# Patient Record
Sex: FEMALE | Race: WHITE | NOT HISPANIC OR LATINO
[De-identification: names, ages, dates, MRNs, and addresses within clinical notes are randomized per-mention and may not be internally consistent; named-entity substitution may affect disease eponyms.]

---

## 2019-06-24 PROBLEM — Z00.00 ENCOUNTER FOR PREVENTIVE HEALTH EXAMINATION: Status: ACTIVE | Noted: 2019-06-24

## 2019-07-02 ENCOUNTER — APPOINTMENT (OUTPATIENT)
Dept: SURGERY | Facility: CLINIC | Age: 34
End: 2019-07-02

## 2019-07-02 ENCOUNTER — RECORD ABSTRACTING (OUTPATIENT)
Age: 34
End: 2019-07-02

## 2019-07-29 ENCOUNTER — APPOINTMENT (OUTPATIENT)
Dept: SURGERY | Facility: CLINIC | Age: 34
End: 2019-07-29
Payer: MEDICAID

## 2019-07-29 VITALS
WEIGHT: 126 LBS | HEIGHT: 62 IN | BODY MASS INDEX: 23.19 KG/M2 | HEART RATE: 78 BPM | SYSTOLIC BLOOD PRESSURE: 117 MMHG | DIASTOLIC BLOOD PRESSURE: 80 MMHG

## 2019-07-29 DIAGNOSIS — Z82.3 FAMILY HISTORY OF STROKE: ICD-10-CM

## 2019-07-29 DIAGNOSIS — Z86.79 PERSONAL HISTORY OF OTHER DISEASES OF THE CIRCULATORY SYSTEM: ICD-10-CM

## 2019-07-29 DIAGNOSIS — I61.5 NONTRAUMATIC INTRACEREBRAL HEMORRHAGE, INTRAVENTRICULAR: ICD-10-CM

## 2019-07-29 DIAGNOSIS — R55 SYNCOPE AND COLLAPSE: ICD-10-CM

## 2019-07-29 DIAGNOSIS — K42.9 UMBILICAL HERNIA W/OUT OBSTRUCTION OR GANGRENE: ICD-10-CM

## 2019-07-29 DIAGNOSIS — N83.209 UNSPECIFIED OVARIAN CYST, UNSPECIFIED SIDE: ICD-10-CM

## 2019-07-29 DIAGNOSIS — Z83.3 FAMILY HISTORY OF DIABETES MELLITUS: ICD-10-CM

## 2019-07-29 DIAGNOSIS — Z86.59 PERSONAL HISTORY OF OTHER MENTAL AND BEHAVIORAL DISORDERS: ICD-10-CM

## 2019-07-29 PROCEDURE — 99204 OFFICE O/P NEW MOD 45 MIN: CPT

## 2019-07-29 RX ORDER — ETONOGESTREL 68 MG/1
IMPLANT SUBCUTANEOUS
Refills: 0 | Status: ACTIVE | COMMUNITY

## 2019-07-29 NOTE — HISTORY OF PRESENT ILLNESS
[de-identified] : 34 yr old female with 5 yr hx of RIH and umbilical hernia.  It developed when she was pregnant with her 2nd son 5 years ago and had increasing symptoms when she had her 3rd son 2 years ago. After her pregnancy the symptoms resolved until about 1 month ago.  She was lifting something heavy and her inguinal hernia bulged out and stayed a lump for about a week.  It was painful but she denies any nausea or vomiting or obstructive symptoms. It is currently not visible, but still occasionally gives her discomfort in the right groin.  She notices it bulges out when she bears down to have a bowel movement. Her umbilical hernia is asymptomatic and she does not notice any bulge. She does not have any symptoms of hernia in the left groin.

## 2019-07-29 NOTE — CONSULT LETTER
[Dear  ___] : Dear ~JACKELYN, [( Thank you for referring [unfilled] for consultation for _____ )] : Thank you for referring [unfilled] for consultation for [unfilled] [Please see my note below.] : Please see my note below. [Consult Closing:] : Thank you very much for allowing me to participate in the care of this patient.  If you have any questions, please do not hesitate to contact me. [Sincerely,] : Sincerely, [FreeTextEntry1] : I would like to offer her a laparoscopic repair of her right inguinal hernia and primary repair of the umbilical hernia at the same time.  We will plan for August as this works best for her schedule.   [FreeTextEntry3] : Reta Quiroga MD

## 2019-07-29 NOTE — REVIEW OF SYSTEMS
[Heart Rate Is Slow] : slow heart rate [As Noted in HPI] : as noted in HPI [Emotional Problems] : emotional problems [Negative] : Endocrine [FreeTextEntry5] : heart murmur; vasovagal syncopal episodes, orthostatic hypotension. Cardiologist: Dr. De Vela in [place].  [de-identified] : Intracranial hypotension from chronic CSF leak after epidural. Gets blood patches to help with symptoms but it is worse if she lifts something heavier than 20 lbs.  [de-identified] : hx of suicide attempt.

## 2019-07-29 NOTE — PHYSICAL EXAM
[Respiratory Effort] : normal respiratory effort [No Rash or Lesion] : No rash or lesion [Oriented to Person] : oriented to person [Oriented to Place] : oriented to place [Oriented to Time] : oriented to time [Calm] : calm [JVD] : no jugular venous distention  [Abdomen Tenderness] : ~T ~M No abdominal tenderness [Abdominal Masses] : No abdominal masses [de-identified] : NAD [de-identified] : normal [de-identified] : soft, nontender, no bulging hernia defects in groin.  small umbilical hernia - nonincarcerated.

## 2019-07-29 NOTE — PLAN
[FreeTextEntry1] : 1) We discussed the options for surgery including open and laparoscopic approaches.  I would recommend laparoscopic repair with mesh for the inguinal hernia and primary repair for umbilical hernia. We discussed complications of the surgery and expectations for recovery.   She would like to have it done in August when she has additional childcare. \par 2) She will need cardiac clearance by Dr. De Vela for her heart murmur and vasovagal episodes prior to surgery. \par \par Will plan for Wed Aug 21 if possible.

## 2019-08-16 ENCOUNTER — APPOINTMENT (OUTPATIENT)
Dept: SURGERY | Facility: HOSPITAL | Age: 34
End: 2019-08-16
Payer: MEDICAID

## 2019-08-16 PROCEDURE — 49585: CPT

## 2019-08-16 PROCEDURE — 49650 LAP ING HERNIA REPAIR INIT: CPT

## 2019-08-23 ENCOUNTER — OTHER (OUTPATIENT)
Age: 34
End: 2019-08-23

## 2019-08-26 ENCOUNTER — APPOINTMENT (OUTPATIENT)
Dept: SURGERY | Facility: CLINIC | Age: 34
End: 2019-08-26
Payer: MEDICAID

## 2019-08-26 VITALS
BODY MASS INDEX: 23.19 KG/M2 | HEART RATE: 50 BPM | WEIGHT: 126 LBS | DIASTOLIC BLOOD PRESSURE: 65 MMHG | SYSTOLIC BLOOD PRESSURE: 115 MMHG | HEIGHT: 62 IN

## 2019-08-26 VITALS — TEMPERATURE: 98.9 F

## 2019-08-26 PROCEDURE — 99024 POSTOP FOLLOW-UP VISIT: CPT

## 2019-08-26 NOTE — PHYSICAL EXAM
[Abdominal Masses] : No abdominal masses [de-identified] : NAD [de-identified] : Abdomen softly distended - tender in right groin near inguinal ligament.  No masses appreciated - even when standing. Possible mild swelling.  No erythema or ecchymosis.  Umbilical area with some firmness -likely underlying fluid from hernia repair.

## 2019-08-26 NOTE — CONSULT LETTER
[Dear  ___] : Dear  [unfilled], [Courtesy Letter:] : I had the pleasure of seeing your patient, [unfilled], in my office today. [Please see my note below.] : Please see my note below. [Sincerely,] : Sincerely, [FreeTextEntry1] : I will review her CT scan which she will have later today.

## 2019-08-26 NOTE — HISTORY OF PRESENT ILLNESS
[de-identified] : 34 yr old who underwent a laparoscopic RIH repair and open umbilical hernia repair on 8/16/19. Intra-op there was no obvious hernia and I had to further explore her direct and indirect and femoral spaces.  I was able to remove a small amt of fat from the indirect space - but no large defect was noted.  I placed lightweight mesh in the right groin (3D Max - prolene mesh).  Post op she had significant issues with orthostatic hypotension and bradycardia.  This is a chronic issue for her but exaserbated by surgery.  She was seen by cardiology who adjusted her meds and helped her with IVF volume expansion.  She left POD 3. \par She was having a low grade fever to 100.7 on Friday 8/23 and was having burning with urination.  She was advised to f/u with her PCP - Yodit Charles from [facility].  I spoke with Dr. Charles during that office visit and she was concerned that Ivet was having a lot of r groin pain and discomfort and what she thought was a bulge.  She ordered a CT scan and labs.  CT is to be done at 1:30 pm today.  [de-identified] : Doing oK but having right groin pain.  Also not passing gas...since discharge from hospital she states. No nausea/vomiting. No more fevers.  Urinating ok. No further burning pain.

## 2019-09-05 ENCOUNTER — APPOINTMENT (OUTPATIENT)
Dept: UROLOGY | Facility: CLINIC | Age: 34
End: 2019-09-05
Payer: MEDICAID

## 2019-09-05 VITALS
HEIGHT: 62 IN | WEIGHT: 126 LBS | SYSTOLIC BLOOD PRESSURE: 110 MMHG | BODY MASS INDEX: 23.19 KG/M2 | DIASTOLIC BLOOD PRESSURE: 75 MMHG | HEART RATE: 69 BPM

## 2019-09-05 DIAGNOSIS — K40.90 UNILATERAL INGUINAL HERNIA, W/OUT OBSTRUCTION OR GANGRENE, NOT SPECIFIED AS RECURRENT: ICD-10-CM

## 2019-09-05 PROCEDURE — 51798 US URINE CAPACITY MEASURE: CPT

## 2019-09-05 PROCEDURE — 99203 OFFICE O/P NEW LOW 30 MIN: CPT | Mod: 25

## 2019-09-05 RX ORDER — NITROFURANTOIN (MONOHYDRATE/MACROCRYSTALS) 25; 75 MG/1; MG/1
100 CAPSULE ORAL
Qty: 30 | Refills: 0 | Status: ACTIVE | COMMUNITY
Start: 2019-09-05 | End: 1900-01-01

## 2019-09-05 NOTE — ASSESSMENT
[FreeTextEntry1] : This is an initial visit for this 34-year-old patient who underwent robot-assisted assisted laparoscopic right inguinal hernia he developed urinary retention postop\par She's regain normal urination but continues to be somewhat symptomatic\par A bladder ultrasound shows a significant postvoid residual despite the fact the patient had just voided\par She does have a urinary complaint is at end of urination chest discomfort\par HEENT history at age 19 she had frequent postcoital urinary tract infection but she seems to have upper\par I suspect she is urethral stenosis and have offered her an alpha-blocker but she can't she suffers from hypotension and is on blood pressure support of medication\par I therefore suggested an observation cystoscopy with possible urethral dilatation in the office\par I will culture urine prior to that exam\par As a precaution prior to the examination of advised to take one Macrobid once a day\par If she is going to have sex she should htake one  prior to sexual intercourse

## 2019-09-05 NOTE — PHYSICAL EXAM
[General Appearance - Well Developed] : well developed [Normal Appearance] : normal appearance [General Appearance - Well Nourished] : well nourished [General Appearance - In No Acute Distress] : no acute distress [Well Groomed] : well groomed [Edema] : no peripheral edema [Respiration, Rhythm And Depth] : normal respiratory rhythm and effort [Exaggerated Use Of Accessory Muscles For Inspiration] : no accessory muscle use [Abdomen Soft] : soft [Costovertebral Angle Tenderness] : no ~M costovertebral angle tenderness [Urinary Bladder Findings] : the bladder was normal on palpation [Abdomen Tenderness] : non-tender [Normal Station and Gait] : the gait and station were normal for the patient's age [] : no rash [No Focal Deficits] : no focal deficits [Affect] : the affect was normal [Oriented To Time, Place, And Person] : oriented to person, place, and time [Mood] : the mood was normal [Not Anxious] : not anxious [No Palpable Adenopathy] : no palpable adenopathy

## 2019-09-05 NOTE — ADDENDUM
[FreeTextEntry1] : PVR\par A transabdominal ultrasound was performed\par His of the bladder in the transverse and longitudinal axes were taken\par Post void residual was calculated 188 ml

## 2019-09-17 ENCOUNTER — APPOINTMENT (OUTPATIENT)
Dept: UROLOGY | Facility: CLINIC | Age: 34
End: 2019-09-17
Payer: MEDICAID

## 2019-09-17 DIAGNOSIS — N31.2 FLACCID NEUROPATHIC BLADDER, NOT ELSEWHERE CLASSIFIED: ICD-10-CM

## 2019-09-17 DIAGNOSIS — R33.9 RETENTION OF URINE, UNSPECIFIED: ICD-10-CM

## 2019-09-17 PROCEDURE — 52281 CYSTOSCOPY AND TREATMENT: CPT

## 2019-09-17 NOTE — PHYSICAL EXAM
[General Appearance - Well Developed] : well developed [General Appearance - Well Nourished] : well nourished [Normal Appearance] : normal appearance [General Appearance - In No Acute Distress] : no acute distress [Well Groomed] : well groomed [Abdomen Soft] : soft [Abdomen Tenderness] : non-tender [Urinary Bladder Findings] : the bladder was normal on palpation [Costovertebral Angle Tenderness] : no ~M costovertebral angle tenderness [Edema] : no peripheral edema [Respiration, Rhythm And Depth] : normal respiratory rhythm and effort [Exaggerated Use Of Accessory Muscles For Inspiration] : no accessory muscle use [] : no respiratory distress [Affect] : the affect was normal [Oriented To Time, Place, And Person] : oriented to person, place, and time [Not Anxious] : not anxious [Mood] : the mood was normal [Normal Station and Gait] : the gait and station were normal for the patient's age [No Focal Deficits] : no focal deficits [No Palpable Adenopathy] : no palpable adenopathy

## 2019-09-17 NOTE — ADDENDUM
[FreeTextEntry1] : The patient recumbent 10 cc of lidocaine gel was instilled into the urethra adequate time was allowed for the instillation to be effective\par She was then dilated with curved metal Mihai sounds size 20 through 40 after applying 2 extra doses of lidocaine jelly \par a cystoscopy was performed showing a large capacity atonic bladder\par The bladder was very thin-walled and large patient relates that in retrospect she pushes to avoid confirming the diagnosis

## 2019-09-17 NOTE — ASSESSMENT
[FreeTextEntry1] : This is a followup visit for this 34 patient who had difficulty urinating after robotic assisted laparoscopic right inguinal hernia repair\par Bladder ultrasound shows significant post void residual despite the fact the patient just voided\par She is unable to take alpha blocker and she has hypotension she was accordingly seen for evaluation and found to have an atonic bladder\par The patient states in retrospect that she voids using the Credé maneuver\par A urethral dilatation is carried out to size 40 [place] chest the patient called back and tell us that she is indeed urinating better

## 2019-10-01 ENCOUNTER — APPOINTMENT (OUTPATIENT)
Dept: SURGERY | Facility: CLINIC | Age: 34
End: 2019-10-01